# Patient Record
Sex: FEMALE | Race: WHITE | Employment: FULL TIME | URBAN - NONMETROPOLITAN AREA
[De-identification: names, ages, dates, MRNs, and addresses within clinical notes are randomized per-mention and may not be internally consistent; named-entity substitution may affect disease eponyms.]

---

## 2021-09-27 ENCOUNTER — OFFICE VISIT (OUTPATIENT)
Dept: OBGYN CLINIC | Age: 26
End: 2021-09-27

## 2021-09-27 VITALS — SYSTOLIC BLOOD PRESSURE: 115 MMHG | WEIGHT: 216 LBS | DIASTOLIC BLOOD PRESSURE: 72 MMHG

## 2021-09-27 DIAGNOSIS — N91.1 SECONDARY AMENORRHEA: Primary | ICD-10-CM

## 2021-09-27 DIAGNOSIS — L70.9 ACNE, UNSPECIFIED ACNE TYPE: ICD-10-CM

## 2021-09-27 PROCEDURE — 99203 OFFICE O/P NEW LOW 30 MIN: CPT | Performed by: NURSE PRACTITIONER

## 2021-09-27 ASSESSMENT — ENCOUNTER SYMPTOMS
RESPIRATORY NEGATIVE: 1
GASTROINTESTINAL NEGATIVE: 1

## 2021-09-27 NOTE — PROGRESS NOTES
PROBLEM VISIT     Date of service: 2021    Yaneth Mathis  Is a 23 y.o. female    PT's PCP is: No primary care provider on file. : 1995                                             Subjective:       Patient's last menstrual period was 2021. OB History    Para Term  AB Living   2 2 2     2   SAB TAB Ectopic Molar Multiple Live Births             2      # Outcome Date GA Lbr Channing/2nd Weight Sex Delivery Anes PTL Lv   2 Term 19 40w0d 04:00 / 00:19 8 lb 8.7 oz (3.875 kg) F Vag-Spont EPI N DAYA   1 Term 17 39w0d  8 lb 3 oz (3.714 kg) F Vag-Spont   DAYA        Social History     Tobacco Use   Smoking Status Never Smoker   Smokeless Tobacco Never Used        Social History     Substance and Sexual Activity   Alcohol Use Yes    Comment: occ       Allergies: Benadryl [diphenhydramine] and Pcn [penicillins]      Current Outpatient Medications:     BIOTIN PO, Take by mouth, Disp: , Rfl:     Social History     Substance and Sexual Activity   Sexual Activity Yes    Partners: Male     Chief Complaint   Patient presents with    Amenorrhea     No menses since May. Reports irregular cycles since last delivery in . Has been rx OCPS in past by previous OBGYN and has not helpful. States has taken several negative pregnancy tests. PE:  Vital Signs  Blood pressure 115/72, weight 216 lb (98 kg), last menstrual period 2021, not currently breastfeeding. HPI: Patient presents today with complaints of amenorrhea since May with several negative pregnancy tests. Reports using Depo for approximately 9 months following delivery in 2019. 2020-May 2021 had menses every other month, lasting 5-7 days. Denies any significant weight gain or loss,extreme stress or endocrine dysfunction. Reports ance along jaw line and cheeks. PT denies fever, chills, nausea and vomiting       Review of Systems   Constitutional: Negative. Respiratory: Negative. Cardiovascular: Negative. Gastrointestinal: Negative. Endocrine: Negative. Genitourinary: Positive for menstrual problem. Negative for dysuria, frequency, pelvic pain, vaginal bleeding and vaginal discharge. Skin:        acne   Neurological: Negative. Negative for syncope, light-headedness and headaches. Physical Exam  Constitutional:       Appearance: Normal appearance. She is obese. HENT:      Head: Normocephalic. Pulmonary:      Effort: Pulmonary effort is normal.   Musculoskeletal:         General: Normal range of motion. Neurological:      General: No focal deficit present. Mental Status: She is alert. Psychiatric:         Mood and Affect: Mood normal.         Behavior: Behavior normal.         Assessment and Plan          Diagnosis Orders   1. Secondary amenorrhea  hCG, Quantitative, Pregnancy    TSH With Reflex Ft4    Prolactin    Glucose Tolerance, 2 Hours    Insulin, Total    Insulin, Total   2. Acne, unspecified acne type  Testosterone, Free     Reviewed possible differentials- will proceed with labs at this time. With negative hcg will plan for Provera challenge. discussed importance of diet and exercise how how they can influence cycles. I have discontinued Ame Haro's montelukast, Cetirizine HCl, Fluticasone Propionate (FLONASE NA), and medroxyPROGESTERone. I am also having her maintain her BIOTIN PO. No follow-ups on file. There are no Patient Instructions on file for this visit. Over 50% of time spent on counseling and care coordination on: see assessment and plan,  She was also counseled on her preventative health maintenance recommendations and follow-up.         FF time: 30 min      Marlyse Scheuermann, APRN - NP,9/28/2021 1:08 PM

## 2021-09-28 ASSESSMENT — ENCOUNTER SYMPTOMS: ROS SKIN COMMENTS: ACNE

## 2021-10-11 ENCOUNTER — HOSPITAL ENCOUNTER (OUTPATIENT)
Age: 26
Setting detail: SPECIMEN
Discharge: HOME | End: 2021-10-11
Payer: MEDICARE

## 2021-10-11 DIAGNOSIS — L70.9 ACNE, UNSPECIFIED ACNE TYPE: ICD-10-CM

## 2021-10-11 DIAGNOSIS — N91.1 SECONDARY AMENORRHEA: ICD-10-CM

## 2021-10-11 LAB
AMOUNT GLUCOSE GIVEN: 75 G
GLUCOSE FASTING: 87 MG/DL (ref 65–99)
GLUCOSE TOLERANCE TEST 1 HOUR: 59 MG/DL (ref 65–184)
GLUCOSE TOLERANCE TEST 2 HOUR: 86 MG/DL (ref 65–139)
HCG QUANTITATIVE: <1 IU/L
INSULIN COMMENT: NORMAL
INSULIN COMMENT: NORMAL
INSULIN REFERENCE RANGE:: NORMAL
INSULIN REFERENCE RANGE:: NORMAL
INSULIN: 127.1 MU/L
INSULIN: 17.9 MU/L
PROLACTIN: 22.73 UG/L (ref 4.79–23.3)
SEX HORMONE BINDING GLOBULIN: 20 NMOL/L (ref 30–135)
TESTOSTERONE FREE-NONMALE: 10.8 PG/ML (ref 0.8–7.4)
TESTOSTERONE TOTAL: 46 NG/DL (ref 20–70)
TSH SERPL DL<=0.05 MIU/L-ACNC: 1.56 MIU/L (ref 0.3–5)

## 2021-10-13 ENCOUNTER — HOSPITAL ENCOUNTER (EMERGENCY)
Age: 26
Discharge: HOME | End: 2021-10-13
Attending: FAMILY MEDICINE
Payer: MEDICARE

## 2021-10-13 VITALS
WEIGHT: 209 LBS | TEMPERATURE: 97.4 F | SYSTOLIC BLOOD PRESSURE: 101 MMHG | DIASTOLIC BLOOD PRESSURE: 76 MMHG | RESPIRATION RATE: 16 BRPM | HEART RATE: 85 BPM | OXYGEN SATURATION: 94 % | HEIGHT: 62 IN | BODY MASS INDEX: 38.46 KG/M2

## 2021-10-13 DIAGNOSIS — J30.89 SEASONAL ALLERGIC RHINITIS DUE TO OTHER ALLERGIC TRIGGER: ICD-10-CM

## 2021-10-13 DIAGNOSIS — Z20.822 LAB TEST NEGATIVE FOR COVID-19 VIRUS: ICD-10-CM

## 2021-10-13 DIAGNOSIS — J06.9 VIRAL URI: Primary | ICD-10-CM

## 2021-10-13 LAB — SARS-COV-2, NAAT: NOT  DETECTED

## 2021-10-13 PROCEDURE — 99283 EMERGENCY DEPT VISIT LOW MDM: CPT

## 2021-10-13 RX ORDER — MEDROXYPROGESTERONE ACETATE 10 MG/1
10 TABLET ORAL DAILY
Qty: 10 TABLET | Refills: 0 | Status: SHIPPED | OUTPATIENT
Start: 2021-10-13 | End: 2021-10-13

## 2021-10-13 RX ORDER — FLUTICASONE PROPIONATE 50 MCG
2 SPRAY, SUSPENSION (ML) NASAL DAILY
Qty: 16 G | Refills: 2 | Status: SHIPPED | OUTPATIENT
Start: 2021-10-13

## 2021-10-13 ASSESSMENT — ENCOUNTER SYMPTOMS
EYE ITCHING: 1
CHEST TIGHTNESS: 0
DIARRHEA: 0
NAUSEA: 0
WHEEZING: 0
COLOR CHANGE: 0
ABDOMINAL PAIN: 0
VOMITING: 0
SHORTNESS OF BREATH: 0
SINUS PRESSURE: 0
SORE THROAT: 1
EYE REDNESS: 1
BACK PAIN: 0

## 2021-10-13 ASSESSMENT — PAIN DESCRIPTION - DESCRIPTORS: DESCRIPTORS: ACHING

## 2021-10-13 ASSESSMENT — PAIN DESCRIPTION - PAIN TYPE: TYPE: ACUTE PAIN

## 2021-10-13 ASSESSMENT — PAIN DESCRIPTION - LOCATION: LOCATION: HEAD

## 2021-10-13 ASSESSMENT — PAIN SCALES - GENERAL: PAINLEVEL_OUTOF10: 4

## 2021-10-13 NOTE — ED NOTES
Pt pink, warm and dry, breathing with ease. Prescription explained, pt states understanding. AVS reviewed including follow up appointments, diagnosis, and care of self at home. Denies questions or concerns. Pt remains alert and oriented. Pt discharged in stable condition.       Crystal Latif RN  10/13/21 3018

## 2021-10-13 NOTE — ED PROVIDER NOTES
[facility]          CHIEF COMPLAINT       Chief Complaint   Patient presents with    Other     Pt was exposed to coVid and needs tested to return to work. Nurses Notes reviewed and I agree except as noted in the HPI. HISTORY OF PRESENT ILLNESS    Kai Germain is a 23 y.o. female who presents for evaluation of possible COVID-19 infection. She states that her mother works in a nursing home and is awaiting her COVID-19 test result but they believe that she may have the infection so patient is concerned that she too may have COVID-23. Patient has noted headache, sore throat, congestion, loss of smell over the past couple of days. She states that today her eyes are red and itchy secondary to cat exposure at her mother's house. She states that her daughter has had a temperature today as well. REVIEW OF SYSTEMS     Review of Systems   Constitutional: Negative for appetite change, chills, fatigue and fever. HENT: Positive for congestion, ear pain and sore throat. Negative for sinus pressure. Loss of smell   Eyes: Positive for redness (after exposure to cat to which she is allergic) and itching. Respiratory: Negative for chest tightness, shortness of breath and wheezing. Cardiovascular: Negative for chest pain and leg swelling. Gastrointestinal: Negative for abdominal pain, diarrhea, nausea and vomiting. Genitourinary: Negative for dysuria, flank pain and frequency. Musculoskeletal: Negative for back pain, gait problem, joint swelling and neck stiffness. Skin: Negative for color change and rash. Neurological: Positive for headaches. Negative for dizziness and light-headedness. Psychiatric/Behavioral: Negative for agitation and hallucinations. The patient is not nervous/anxious. PAST MEDICAL HISTORY    has a past medical history of Allergic, Anxiety, Asthma, Depression, H/O headache, and Scoliosis.     SURGICAL HISTORY      has a past surgical history that includes Tonsillectomy. CURRENT MEDICATIONS       Previous Medications    BIOTIN PO    Take by mouth       ALLERGIES     is allergic to benadryl [diphenhydramine] and pcn [penicillins]. FAMILY HISTORY     She indicated that her mother is alive. She indicated that her father is alive. She indicated that the status of her sister is unknown. She indicated that her brother is alive. She indicated that the status of her paternal grandmother is unknown. She indicated that the status of her paternal grandfather is unknown.   family history includes Asthma in her father, paternal grandfather, and paternal grandmother; Deep Vein Thrombosis in her father; Depression in her brother, father, mother, and sister; Heart Disease in her father; High Cholesterol in her father, paternal grandfather, and paternal grandmother; Hypertension in her father, paternal grandfather, and paternal grandmother. SOCIAL HISTORY      reports that she has never smoked. She has never used smokeless tobacco. She reports current alcohol use. She reports that she does not use drugs. PHYSICAL EXAM     INITIAL VITALS:  height is 5' 2\" (1.575 m) and weight is 209 lb (94.8 kg). Her temporal temperature is 97.4 °F (36.3 °C). Her blood pressure is 101/76 and her pulse is 85. Her respiration is 16 and oxygen saturation is 94%. Physical Exam  Vitals and nursing note reviewed. Constitutional:       General: She is not in acute distress. Appearance: She is well-developed. HENT:      Head: Normocephalic and atraumatic. Right Ear: Tympanic membrane and ear canal normal.      Left Ear: Tympanic membrane and ear canal normal.   Eyes:      General:         Right eye: No discharge. Left eye: No discharge. Pupils: Pupils are equal, round, and reactive to light. Comments: Bilateral conjunctival injection right greater than left. Minimal edema noted to the upper right eyelid. Cardiovascular:      Rate and Rhythm: Normal rate and regular rhythm. Heart sounds: Normal heart sounds. Pulmonary:      Effort: Pulmonary effort is normal.      Breath sounds: Normal breath sounds. Abdominal:      General: Bowel sounds are normal. There is no distension. Palpations: Abdomen is soft. There is no mass. Tenderness: There is no abdominal tenderness. Skin:     General: Skin is warm and dry. Psychiatric:         Mood and Affect: Mood normal.         Behavior: Behavior normal.         DIFFERENTIAL DIAGNOSIS:   Viral URI, allergic rhinitis, COVID-19 infection    DIAGNOSTIC RESULTS         LABS:   Labs Reviewed   COVID-19, RAPID       DEPARTMENT COURSE:   Vitals:    Vitals:    10/13/21 1446   BP: 101/76   Pulse: 85   Resp: 16   Temp: 97.4 °F (36.3 °C)   TempSrc: Temporal   SpO2: 94%   Weight: 209 lb (94.8 kg)   Height: 5' 2\" (1.575 m)       MDM:  Patient presents with concern for possible COVID-19 infection after having URI symptoms combined with allergies. She test negative for COVID-19. She is recommend to use Flonase. She was previously using Claritin and was recommended by pharmacist to try Zyrtec. Based on patient's right conjunctivitis I recommended Flonase use. CRITICAL CARE:   None    CONSULTS:  None    PROCEDURES:  None    FINAL IMPRESSION      1. Viral URI    2. Seasonal allergic rhinitis due to other allergic trigger          DISPOSITION/PLAN   Discharge    PATIENT REFERRED TO:  Your doctor or call [phone] to find a doctor near you.     Schedule an appointment as soon as possible for a visit   As needed      DISCHARGEMEDICATIONS:  New Prescriptions    FLUTICASONE (FLONASE) 50 MCG/ACT NASAL SPRAY    2 sprays by Each Nostril route daily       (Please note that portions of this note were completedwith a voice recognition program.  Efforts were made to edit the dictations but occasionally words are mis-transcribed.)    MD Arnaud Vazquez, MD  10/13/21 1540

## 2021-11-02 ENCOUNTER — OFFICE VISIT (OUTPATIENT)
Dept: OBGYN CLINIC | Age: 26
End: 2021-11-02
Payer: MEDICARE

## 2021-11-02 VITALS — SYSTOLIC BLOOD PRESSURE: 115 MMHG | WEIGHT: 210.7 LBS | DIASTOLIC BLOOD PRESSURE: 71 MMHG | BODY MASS INDEX: 38.54 KG/M2

## 2021-11-02 DIAGNOSIS — E88.81 METABOLIC SYNDROME: Primary | ICD-10-CM

## 2021-11-02 PROCEDURE — G8417 CALC BMI ABV UP PARAM F/U: HCPCS | Performed by: NURSE PRACTITIONER

## 2021-11-02 PROCEDURE — 1036F TOBACCO NON-USER: CPT | Performed by: NURSE PRACTITIONER

## 2021-11-02 PROCEDURE — G8427 DOCREV CUR MEDS BY ELIG CLIN: HCPCS | Performed by: NURSE PRACTITIONER

## 2021-11-02 PROCEDURE — G8484 FLU IMMUNIZE NO ADMIN: HCPCS | Performed by: NURSE PRACTITIONER

## 2021-11-02 PROCEDURE — 99213 OFFICE O/P EST LOW 20 MIN: CPT | Performed by: NURSE PRACTITIONER

## 2021-11-02 NOTE — PROGRESS NOTES
PROBLEM VISIT     Date of service: 2021    Jannie Garrison  Is a 23 y.o. female    PT's PCP is: No primary care provider on file. : 1995                                             Subjective:       Patient's last menstrual period was 10/21/2021. OB History    Para Term  AB Living   2 2 2     2   SAB TAB Ectopic Molar Multiple Live Births             2      # Outcome Date GA Lbr Channing/2nd Weight Sex Delivery Anes PTL Lv   2 Term 19 40w0d 04:00 / 00:19 8 lb 8.7 oz (3.875 kg) F Vag-Spont EPI N DAYA   1 Term 17 39w0d  8 lb 3 oz (3.714 kg) F Vag-Spont   DAYA        Social History     Tobacco Use   Smoking Status Never Smoker   Smokeless Tobacco Never Used        Social History     Substance and Sexual Activity   Alcohol Use Yes    Comment: occ       Allergies: Benadryl [diphenhydramine] and Pcn [penicillins]      Current Outpatient Medications:     fluticasone (FLONASE) 50 MCG/ACT nasal spray, 2 sprays by Each Nostril route daily, Disp: 16 g, Rfl: 2    BIOTIN PO, Take by mouth, Disp: , Rfl:     Social History     Substance and Sexual Activity   Sexual Activity Yes    Partners: Male     Chief Complaint   Patient presents with    Follow-up     Follow up after Provera. Reports menses started 10/21-10/29. Discuss recent labs. PE:  Vital Signs  Blood pressure 115/71, weight 210 lb 11.2 oz (95.6 kg), last menstrual period 10/21/2021, not currently breastfeeding. HPI: Patient presents today to discuss recent labs. States she had Provera withdraw bleed from 10/21-10/29. PT denies fever, chills, nausea and vomiting       Review of Systems   Constitutional: Negative. Respiratory: Negative. Cardiovascular: Negative. Gastrointestinal: Negative. Genitourinary: Positive for menstrual problem. Negative for dysuria, frequency, pelvic pain, vaginal bleeding and vaginal discharge.    Skin:        Ance- jaw line, cheeks       Physical Exam  Constitutional: Appearance: Normal appearance. She is obese. HENT:      Head: Normocephalic. Pulmonary:      Effort: Pulmonary effort is normal.   Musculoskeletal:         General: Normal range of motion. Neurological:      General: No focal deficit present. Mental Status: She is alert. Psychiatric:         Mood and Affect: Mood normal.         Behavior: Behavior normal.         Assessment and Plan          Diagnosis Orders   1. Metabolic syndrome  metFORMIN (GLUCOPHAGE) 500 MG tablet     Labs reviewed     Fasting insulin 17.9 (ideally would like less than 10) and two hr level 127.1 (ideally would like less than 30). IR reviewed in detail encouraged 5-10% weight loss. Metformin discussed. reviewed PCOS and rotterdam criteria. Questions answered. Patient declines cycle control at this time. She is aware to call if no menses in 3 months for Provera challenge. I am having Ame Haro start on metFORMIN. I am also having her maintain her BIOTIN PO and fluticasone. No follow-ups on file. There are no Patient Instructions on file for this visit. Over 50% of time spent on counseling and care coordination on: see assessment and plan,  She was also counseled on her preventative health maintenance recommendations and follow-up.         FF time: 20 min      CARIE Virgen NP,11/5/2021 1:31 PM

## 2021-11-05 ASSESSMENT — ENCOUNTER SYMPTOMS
GASTROINTESTINAL NEGATIVE: 1
RESPIRATORY NEGATIVE: 1

## 2022-01-03 ENCOUNTER — OFFICE VISIT (OUTPATIENT)
Dept: OBGYN CLINIC | Age: 27
End: 2022-01-03
Payer: MEDICARE

## 2022-01-03 VITALS — DIASTOLIC BLOOD PRESSURE: 70 MMHG | WEIGHT: 211.6 LBS | BODY MASS INDEX: 38.7 KG/M2 | SYSTOLIC BLOOD PRESSURE: 102 MMHG

## 2022-01-03 DIAGNOSIS — E88.81 METABOLIC SYNDROME: Primary | ICD-10-CM

## 2022-01-03 DIAGNOSIS — E28.2 POLYCYSTIC OVARIAN SYNDROME: ICD-10-CM

## 2022-01-03 DIAGNOSIS — N92.6 IRREGULAR MENSES: ICD-10-CM

## 2022-01-03 PROCEDURE — 1036F TOBACCO NON-USER: CPT | Performed by: NURSE PRACTITIONER

## 2022-01-03 PROCEDURE — G8417 CALC BMI ABV UP PARAM F/U: HCPCS | Performed by: NURSE PRACTITIONER

## 2022-01-03 PROCEDURE — G8427 DOCREV CUR MEDS BY ELIG CLIN: HCPCS | Performed by: NURSE PRACTITIONER

## 2022-01-03 PROCEDURE — G8484 FLU IMMUNIZE NO ADMIN: HCPCS | Performed by: NURSE PRACTITIONER

## 2022-01-03 PROCEDURE — 99213 OFFICE O/P EST LOW 20 MIN: CPT | Performed by: NURSE PRACTITIONER

## 2022-01-03 ASSESSMENT — ENCOUNTER SYMPTOMS
RESPIRATORY NEGATIVE: 1
GASTROINTESTINAL NEGATIVE: 1

## 2022-01-03 NOTE — PROGRESS NOTES
PROBLEM VISIT     Date of service: 1/3/2022    Rosio Mcgarry  Is a 27 y.o. female    PT's PCP is: No primary care provider on file. : 1995                                             Subjective:       Patient's last menstrual period was 2021. OB History    Para Term  AB Living   2 2 2     2   SAB IAB Ectopic Molar Multiple Live Births             2      # Outcome Date GA Lbr Channing/2nd Weight Sex Delivery Anes PTL Lv   2 Term 19 40w0d 04:00 / 00:19 8 lb 8.7 oz (3.875 kg) F Vag-Spont EPI N DAYA   1 Term 17 39w0d  8 lb 3 oz (3.714 kg) F Vag-Spont   DAYA        Social History     Tobacco Use   Smoking Status Never Smoker   Smokeless Tobacco Never Used        Social History     Substance and Sexual Activity   Alcohol Use Yes    Comment: occ       Allergies: Benadryl [diphenhydramine] and Pcn [penicillins]      Current Outpatient Medications:     metFORMIN (GLUCOPHAGE) 500 MG tablet, Take 1 tablet by mouth 2 times daily (with meals), Disp: 60 tablet, Rfl: 2    fluticasone (FLONASE) 50 MCG/ACT nasal spray, 2 sprays by Each Nostril route daily, Disp: 16 g, Rfl: 2    BIOTIN PO, Take by mouth, Disp: , Rfl:     Social History     Substance and Sexual Activity   Sexual Activity Yes    Partners: Male     Chief Complaint   Patient presents with    Follow-up     Follow up on Metformin. States only took 30 days worth of metformin because that is all she recieved from pharmacy         PE:  Vital Signs  Blood pressure 102/70, weight 211 lb 9.6 oz (96 kg), last menstrual period 2021, not currently breastfeeding. HPI: Patient presents today for medication follow up. Reports she took 30 days of Metformin because that is all the pharmacy gave her. Denies any undesirable side effects at this time. PT denies fever, chills, nausea and vomiting       Review of Systems   Constitutional: Negative. Respiratory: Negative. Cardiovascular: Negative.     Gastrointestinal: Negative. Genitourinary: Positive for menstrual problem (irregular ). Negative for dysuria, frequency, pelvic pain, vaginal bleeding and vaginal discharge. Physical Exam  Constitutional:       Appearance: Normal appearance. She is obese. HENT:      Head: Normocephalic. Pulmonary:      Effort: Pulmonary effort is normal.   Musculoskeletal:         General: Normal range of motion. Neurological:      General: No focal deficit present. Mental Status: She is alert. Psychiatric:         Mood and Affect: Mood normal.         Behavior: Behavior normal.         Assessment and Plan          Diagnosis Orders   1. Metabolic syndrome  metFORMIN (GLUCOPHAGE) 500 MG tablet   2. Polycystic ovarian syndrome     3. Irregular menses  hCG, Quantitative, Pregnancy       reviewed metformin and encouraged diet/exercise regimen. Patient eliminating pop/sugary drinks. I have changed Ame Haro's metFORMIN. I am also having her maintain her BIOTIN PO and fluticasone. Return in about 3 months (around 4/3/2022) for yearly, med check. There are no Patient Instructions on file for this visit.       CARIE Maldonado NP,1/3/2022 9:27 PM

## 2022-03-11 ENCOUNTER — HOSPITAL ENCOUNTER (EMERGENCY)
Age: 27
Discharge: HOME | End: 2022-03-11
Attending: FAMILY MEDICINE
Payer: COMMERCIAL

## 2022-03-11 VITALS
TEMPERATURE: 96.9 F | HEART RATE: 72 BPM | BODY MASS INDEX: 39.56 KG/M2 | HEIGHT: 62 IN | RESPIRATION RATE: 19 BRPM | WEIGHT: 215 LBS | DIASTOLIC BLOOD PRESSURE: 78 MMHG | SYSTOLIC BLOOD PRESSURE: 140 MMHG | OXYGEN SATURATION: 98 %

## 2022-03-11 DIAGNOSIS — L02.91 ABSCESS: Primary | ICD-10-CM

## 2022-03-11 PROCEDURE — 99285 EMERGENCY DEPT VISIT HI MDM: CPT

## 2022-03-11 RX ORDER — SULFAMETHOXAZOLE AND TRIMETHOPRIM 800; 160 MG/1; MG/1
1 TABLET ORAL 2 TIMES DAILY
Qty: 14 TABLET | Refills: 0 | Status: SHIPPED | OUTPATIENT
Start: 2022-03-11 | End: 2022-03-18

## 2022-03-11 RX ORDER — ALBUTEROL SULFATE 90 UG/1
2 AEROSOL, METERED RESPIRATORY (INHALATION) EVERY 6 HOURS PRN
COMMUNITY

## 2022-03-11 ASSESSMENT — ENCOUNTER SYMPTOMS
COLOR CHANGE: 0
NAUSEA: 0
VOMITING: 0

## 2022-03-11 ASSESSMENT — PAIN SCALES - GENERAL: PAINLEVEL_OUTOF10: 6

## 2022-03-11 ASSESSMENT — PAIN DESCRIPTION - PAIN TYPE: TYPE: ACUTE PAIN

## 2022-03-11 NOTE — Clinical Note
Tatum Florian was seen and treated in our emergency department on 3/11/2022. She may return to work on 03/12/2022. If you have any questions or concerns, please don't hesitate to call.       Charlee Samayoa MD

## 2022-03-12 NOTE — ED NOTES
Pt stable A&O x 3 given discharge and follow up info. Pt voiced no concerns and discharged from ER to self to home. Pt ambulated out of ER with no complications .        Jose G Mejias RN  03/11/22 2026

## 2022-03-12 NOTE — ED TRIAGE NOTES
Pt comes walking into ER room 4 from triage with an abscess on her buttocks that she only just noticed today. . . . While she was getting ready for work. She says that she use to get abscess boils all the time on her lower abdomen and in her arm pits, and today she has one on here buttocks in her gluteal  cleft fold.

## 2022-03-12 NOTE — ED PROVIDER NOTES
[facility]  eMERGENCY dEPARTMENT eNCOUnter          CHIEF COMPLAINT       Chief Complaint   Patient presents with    Abscess     BUTTOCKS        Nurses Notes reviewed and I agree except as noted in the HPI. HISTORY OF PRESENT ILLNESS    Liz Eagle is a 27 y.o. female who presents with possible abscess to crease of right buttocks. Patient notes onset today. Mild pain. Denies fever,chills. REVIEW OF SYSTEMS     Review of Systems   Constitutional: Negative for chills and fever. Gastrointestinal: Negative for nausea and vomiting. Skin: Positive for wound (right buttocks crease). Negative for color change. Psychiatric/Behavioral: Negative for agitation and behavioral problems. All other systems reviewed and are negative. PAST MEDICAL HISTORY    has a past medical history of Allergic, Anxiety, Asthma, Depression, Diabetes mellitus ([address] H/O headache, and Scoliosis. SURGICAL HISTORY      has a past surgical history that includes Tonsillectomy. CURRENT MEDICATIONS       Previous Medications    ALBUTEROL SULFATE HFA (VENTOLIN HFA) 108 (90 BASE) MCG/ACT INHALER    Inhale 2 puffs into the lungs every 6 hours as needed for Wheezing    BIOTIN PO    Take by mouth    FLUTICASONE (FLONASE) 50 MCG/ACT NASAL SPRAY    2 sprays by Each Nostril route daily    METFORMIN (GLUCOPHAGE) 500 MG TABLET    Take 1 tablet by mouth 2 times daily (with meals)       ALLERGIES     is allergic to benadryl [diphenhydramine] and pcn [penicillins]. FAMILY HISTORY     She indicated that her mother is alive. She indicated that her father is alive. She indicated that the status of her sister is unknown. She indicated that her brother is alive. She indicated that the status of her paternal grandmother is unknown.  She indicated that the status of her paternal grandfather is unknown.   family history includes Asthma in her father, paternal grandfather, and paternal grandmother; Deep Vein Thrombosis in her father; Depression in her brother, father, mother, and sister; Heart Disease in her father; High Cholesterol in her father, paternal grandfather, and paternal grandmother; Hypertension in her father, paternal grandfather, and paternal grandmother. SOCIAL HISTORY      reports that she has never smoked. She has never used smokeless tobacco. She reports current alcohol use. She reports that she does not use drugs. PHYSICAL EXAM     INITIAL VITALS:  height is 5' 2\" (1.575 m) and weight is 215 lb (97.5 kg). Her temporal temperature is 96.9 °F (36.1 °C). Her blood pressure is 140/78 (abnormal) and her pulse is 72. Her respiration is 19 and oxygen saturation is 98%. Physical Exam  Vitals and nursing note reviewed. Constitutional:       General: She is not in acute distress. Appearance: She is obese. Skin:     Findings: No erythema or rash. Comments: Small non fluctuant abscess right gluteal crease. Neurological:      Mental Status: She is alert. Psychiatric:         Mood and Affect: Mood normal.         Behavior: Behavior normal.         DIFFERENTIAL DIAGNOSIS:   Abscess gluteus crease     DIAGNOSTIC RESULTS         LABS:   Labs Reviewed - No data to display    EMERGENCY DEPARTMENT COURSE:   Vitals:    Vitals:    03/11/22 1944   BP: (!) 140/78   Pulse: 72   Resp: 19   Temp: 96.9 °F (36.1 °C)   TempSrc: Temporal   SpO2: 98%   Weight: 215 lb (97.5 kg)   Height: 5' 2\" (1.575 m)     Small non fluctuant abscess right gluteal crease. Will have patient apply warm compresses. Bactrim DS prescribed. Follow up if abscess enlarges. Care instructions provided. PROCEDURES:  None    FINAL IMPRESSION      1. Abscess          DISPOSITION/PLAN   Home. Care instructions provided. Follow up as needed. PATIENT REFERRED TO:  No follow-up provider specified.     DISCHARGE MEDICATIONS:  New Prescriptions    SULFAMETHOXAZOLE-TRIMETHOPRIM (BACTRIM DS) 800-160 MG PER TABLET    Take 1 tablet by mouth 2 times daily for 7 days       (Please note that portions of this note were completed with a voice recognition program.  Efforts were made to edit the dictations but occasionally words are mis-transcribed.)    MD Brian Acevedo MD  03/11/22 2013

## 2022-04-07 ENCOUNTER — OFFICE VISIT (OUTPATIENT)
Dept: OBGYN CLINIC | Age: 27
End: 2022-04-07
Payer: COMMERCIAL

## 2022-04-07 ENCOUNTER — HOSPITAL ENCOUNTER (OUTPATIENT)
Age: 27
Setting detail: SPECIMEN
Discharge: HOME | End: 2022-04-07

## 2022-04-07 VITALS
BODY MASS INDEX: 38.57 KG/M2 | WEIGHT: 209.6 LBS | SYSTOLIC BLOOD PRESSURE: 115 MMHG | DIASTOLIC BLOOD PRESSURE: 76 MMHG | HEIGHT: 62 IN

## 2022-04-07 DIAGNOSIS — N91.1 SECONDARY AMENORRHEA: ICD-10-CM

## 2022-04-07 DIAGNOSIS — Z01.419 WOMEN'S ANNUAL ROUTINE GYNECOLOGICAL EXAMINATION: Primary | ICD-10-CM

## 2022-04-07 DIAGNOSIS — E88.81 METABOLIC SYNDROME: ICD-10-CM

## 2022-04-07 LAB
CONTROL: NORMAL
PREGNANCY TEST URINE, POC: NEGATIVE

## 2022-04-07 PROCEDURE — 81025 URINE PREGNANCY TEST: CPT | Performed by: NURSE PRACTITIONER

## 2022-04-07 PROCEDURE — 99395 PREV VISIT EST AGE 18-39: CPT | Performed by: NURSE PRACTITIONER

## 2022-04-07 RX ORDER — CETIRIZINE HYDROCHLORIDE 10 MG/1
10 TABLET ORAL DAILY
COMMUNITY

## 2022-04-07 RX ORDER — MEDROXYPROGESTERONE ACETATE 10 MG/1
10 TABLET ORAL DAILY
Qty: 10 TABLET | Refills: 0 | Status: SHIPPED | OUTPATIENT
Start: 2022-04-07 | End: 2022-06-07 | Stop reason: ALTCHOICE

## 2022-04-07 ASSESSMENT — ENCOUNTER SYMPTOMS
DIARRHEA: 0
SHORTNESS OF BREATH: 0
CONSTIPATION: 0
ABDOMINAL PAIN: 0

## 2022-04-07 NOTE — PROGRESS NOTES
YEARLY PHYSICAL    Date of service: 2022    Irvin May  Is a 27 y.o.  female    PT's PCP is: No primary care provider on file. : 1995                                         Chaperone for Intimate Exam   Chaperone was offered as part of the rooming process. Patient declined and agrees to continue with exam without a chaperone.  Chaperone: n/a      Subjective:       No LMP recorded. (Menstrual status: Irregular periods).      Are your menses regular: no    OB History    Para Term  AB Living   2 2 2     2   SAB IAB Ectopic Molar Multiple Live Births             2      # Outcome Date GA Lbr Channing/2nd Weight Sex Delivery Anes PTL Lv   2 Term 19 40w0d 04:00 / 00:19 8 lb 8.7 oz (3.875 kg) F Vag-Spont EPI N DAYA   1 Term 17 39w0d  8 lb 3 oz (3.714 kg) F Vag-Spont   DAYA        Social History     Tobacco Use   Smoking Status Never Smoker   Smokeless Tobacco Never Used        Social History     Substance and Sexual Activity   Alcohol Use Yes    Comment: occ       Family History   Problem Relation Age of Onset    Depression Mother     Asthma Father     Depression Father     Heart Disease Father     Hypertension Father     Deep Vein Thrombosis Father     High Cholesterol Father     Depression Brother     Asthma Paternal Grandfather     Hypertension Paternal Grandfather     High Cholesterol Paternal Grandfather     Asthma Paternal Grandmother     Hypertension Paternal Grandmother     High Cholesterol Paternal Grandmother     Depression Sister        Any family history of breast or ovarian cancer: No    Any family history of blood clots: Yes      Allergies: Benadryl [diphenhydramine] and Pcn [penicillins]      Current Outpatient Medications:     cetirizine (ZYRTEC) 10 MG tablet, Take 10 mg by mouth daily, Disp: , Rfl:     medroxyPROGESTERone (PROVERA) 10 MG tablet, Take 1 tablet by mouth daily, Disp: 10 tablet, Rfl: 0    metFORMIN (GLUCOPHAGE) 500 MG tablet, Take 1 tablet by mouth 2 times daily (with meals), Disp: 60 tablet, Rfl: 1    albuterol sulfate HFA (VENTOLIN HFA) 108 (90 Base) MCG/ACT inhaler, Inhale 2 puffs into the lungs every 6 hours as needed for Wheezing, Disp: , Rfl:     fluticasone (FLONASE) 50 MCG/ACT nasal spray, 2 sprays by Each Nostril route daily, Disp: 16 g, Rfl: 2    BIOTIN PO, Take by mouth, Disp: , Rfl:     Social History     Substance and Sexual Activity   Sexual Activity Yes    Partners: Male       Any bleeding or pain with intercourse: No    Last Yearly:  ? Last pap: ? Last HPV: ?    Do you do self breast exams: encouraged     Past Medical History:   Diagnosis Date    Allergic     Anxiety     Asthma     Depression     Diabetes mellitus ([facility] [address]     H/O headache     Scoliosis        Past Surgical History:   Procedure Laterality Date    TONSILLECTOMY         Family History   Problem Relation Age of Onset    Depression Mother     Asthma Father     Depression Father     Heart Disease Father     Hypertension Father     Deep Vein Thrombosis Father     High Cholesterol Father     Depression Brother     Asthma Paternal Grandfather     Hypertension Paternal Grandfather     High Cholesterol Paternal Grandfather     Asthma Paternal Grandmother     Hypertension Paternal Grandmother     High Cholesterol Paternal Grandmother     Depression Sister        Chief Complaint   Patient presents with    Gynecologic Exam     Last pap 1/21/19. No menses since November. Voices no concerns. PE:  Vital Signs  Blood pressure 115/76, height 5' 2\" (1.575 m), weight 209 lb 9.6 oz (95.1 kg), not currently breastfeeding. Estimated body mass index is 38.34 kg/m² as calculated from the following:    Height as of this encounter: 5' 2\" (1.575 m). Weight as of this encounter: 209 lb 9.6 oz (95.1 kg).     Labs:    Results for orders placed or performed in visit on 04/07/22   POCT urine pregnancy   Result Value Ref Range    Preg Test, Ur Negative     Control         HPI: Patient presents today for annual exam. Denies breast/pelvic pain. Due for pap. Declines STD screening. Reports no menses since November. Has been taking Metformin for the past 3 months and now attempting diet and exercise changes. Review of Systems   Constitutional: Negative for chills, fatigue and fever. Respiratory: Negative for shortness of breath. Cardiovascular: Negative for chest pain. Gastrointestinal: Negative for abdominal pain, constipation and diarrhea. Genitourinary: Positive for menstrual problem. Negative for dysuria, enuresis, frequency, pelvic pain, urgency and vaginal bleeding. Neurological: Negative for dizziness, light-headedness and headaches. Physical Exam  Constitutional:       General: She is not in acute distress. Appearance: Normal appearance. She is not ill-appearing. Genitourinary:      Vulva normal.      No vaginal discharge. Right Adnexa: not tender. Left Adnexa: not tender. Uterus is not tender. Breasts:      Right: No mass, nipple discharge, skin change or tenderness. Left: No mass, nipple discharge, skin change or tenderness. HENT:      Head: Normocephalic. Cardiovascular:      Rate and Rhythm: Normal rate and regular rhythm. Pulmonary:      Effort: Pulmonary effort is normal.      Breath sounds: Normal breath sounds. Abdominal:      Palpations: Abdomen is soft. Tenderness: There is no abdominal tenderness. There is no guarding or rebound. Musculoskeletal:         General: Normal range of motion. Neurological:      General: No focal deficit present. Mental Status: She is alert. Psychiatric:         Mood and Affect: Mood normal.         Behavior: Behavior normal.                          Assessment and Plan          Diagnosis Orders   1.  Women's annual routine gynecological examination  PAP SMEAR 2. Metabolic syndrome  metFORMIN (GLUCOPHAGE) 500 MG tablet   3. Secondary amenorrhea  POCT urine pregnancy    medroxyPROGESTERone (PROVERA) 10 MG tablet       Repeat Annual every 1 year  Cervical Cytology Evaluation begins at 24years old. If Negative Cytology, Follow-up screening per current guidelines. Mammograms every 1year. If 35 yo and last mammogram was negative. Routine healthmaintenance per patients PCP. patient agreeable to provera challenge. Discussed calling if no menses in 3 months. Encouraged continued diet and exercise   Needs repeat fasting insulin at med check       I am having Ame Haro start on medroxyPROGESTERone. I am also having her maintain her BIOTIN PO, fluticasone, albuterol sulfate HFA, cetirizine, and metFORMIN. Return in about 3 months (around 7/7/2022) for med check metformin . She was also counseled on her preventative health maintenance recommendations and follow-up. There are no Patient Instructions on file for this visit.     CARIE Brandon NP,4/7/2022 3:35 PM

## 2022-04-18 LAB — CYTOLOGY REPORT: NORMAL

## 2022-06-07 ENCOUNTER — OFFICE VISIT (OUTPATIENT)
Dept: OBGYN CLINIC | Age: 27
End: 2022-06-07
Payer: MEDICARE

## 2022-06-07 ENCOUNTER — HOSPITAL ENCOUNTER (OUTPATIENT)
Age: 27
Setting detail: SPECIMEN
Discharge: HOME | End: 2022-06-07

## 2022-06-07 VITALS — SYSTOLIC BLOOD PRESSURE: 112 MMHG | WEIGHT: 209 LBS | DIASTOLIC BLOOD PRESSURE: 65 MMHG | BODY MASS INDEX: 38.23 KG/M2

## 2022-06-07 DIAGNOSIS — Z11.3 SCREENING FOR STDS (SEXUALLY TRANSMITTED DISEASES): ICD-10-CM

## 2022-06-07 DIAGNOSIS — Z11.3 SCREENING FOR STDS (SEXUALLY TRANSMITTED DISEASES): Primary | ICD-10-CM

## 2022-06-07 LAB
CANDIDA SPECIES, DNA PROBE: NEGATIVE
GARDNERELLA VAGINALIS, DNA PROBE: NEGATIVE
HEPATITIS B SURFACE ANTIGEN: NONREACTIVE
HEPATITIS C ANTIBODY: NONREACTIVE
HIV AG/AB: NONREACTIVE
SOURCE: NORMAL
T. PALLIDUM, IGG: NONREACTIVE
TRICHOMONAS VAGINALIS DNA: NEGATIVE

## 2022-06-07 PROCEDURE — 99213 OFFICE O/P EST LOW 20 MIN: CPT | Performed by: NURSE PRACTITIONER

## 2022-06-07 PROCEDURE — G8427 DOCREV CUR MEDS BY ELIG CLIN: HCPCS | Performed by: NURSE PRACTITIONER

## 2022-06-07 PROCEDURE — G8417 CALC BMI ABV UP PARAM F/U: HCPCS | Performed by: NURSE PRACTITIONER

## 2022-06-07 PROCEDURE — 1036F TOBACCO NON-USER: CPT | Performed by: NURSE PRACTITIONER

## 2022-06-07 RX ORDER — IBUPROFEN 800 MG/1
TABLET ORAL
COMMUNITY
Start: 2022-05-12

## 2022-06-07 NOTE — PROGRESS NOTES
PROBLEM VISIT     Date of service: 2022    Jeff Matthews  Is a 27 y.o. female    PT's PCP is: No primary care provider on file. : 1995                                             Subjective:       Patient's last menstrual period was 2022. OB History    Para Term  AB Living   2 2 2     2   SAB IAB Ectopic Molar Multiple Live Births             2      # Outcome Date GA Lbr Channing/2nd Weight Sex Delivery Anes PTL Lv   2 Term 19 40w0d 04:00 / :19 8 lb 8.7 oz (3.875 kg) F Vag-Spont EPI N DAYA   1 Term 17 39w0d  8 lb 3 oz (3.714 kg) F Vag-Spont   DAYA        Social History     Tobacco Use   Smoking Status Never Smoker   Smokeless Tobacco Never Used        Social History     Substance and Sexual Activity   Alcohol Use Yes    Comment: occ       Allergies: Benadryl [diphenhydramine] and Pcn [penicillins]      Current Outpatient Medications:     ibuprofen (ADVIL;MOTRIN) 800 MG tablet, , Disp: , Rfl:     cetirizine (ZYRTEC) 10 MG tablet, Take 10 mg by mouth daily, Disp: , Rfl:     metFORMIN (GLUCOPHAGE) 500 MG tablet, Take 1 tablet by mouth 2 times daily (with meals) (Patient not taking: Reported on 2022), Disp: 60 tablet, Rfl: 1    albuterol sulfate HFA (VENTOLIN HFA) 108 (90 Base) MCG/ACT inhaler, Inhale 2 puffs into the lungs every 6 hours as needed for Wheezing, Disp: , Rfl:     fluticasone (FLONASE) 50 MCG/ACT nasal spray, 2 sprays by Each Nostril route daily, Disp: 16 g, Rfl: 2    BIOTIN PO, Take by mouth, Disp: , Rfl:     Social History     Substance and Sexual Activity   Sexual Activity Yes    Partners: Male     Chief Complaint   Patient presents with    Sexually Transmitted Diseases     Desires full STD testing. Denies symptoms. PE:  Vital Signs  Blood pressure 112/65, weight 209 lb (94.8 kg), last menstrual period 2022, not currently breastfeeding. HPI: Patient presents today for STD screening. Denies any symptoms currently. Denies any known exposures. PT denies fever, chills, nausea and vomiting       Review of Systems   Constitutional: Negative. Genitourinary: Negative. Physical Exam  Constitutional:       Appearance: Normal appearance. HENT:      Head: Normocephalic. Pulmonary:      Effort: Pulmonary effort is normal.   Genitourinary:     General: Normal vulva. Vagina: No vaginal discharge, erythema or bleeding. Cervix: Normal.   Musculoskeletal:         General: Normal range of motion. Neurological:      General: No focal deficit present. Mental Status: She is alert. Psychiatric:         Mood and Affect: Mood normal.         Behavior: Behavior normal.     chaperone: not present     Assessment and Plan          Diagnosis Orders   1. Screening for STDs (sexually transmitted diseases)  Chlamydia Trachomatis & Neisseria gonorrhoeae (GC) by amplified detection    Vaginitis DNA Probe    Hepatitis B Surface Antigen    Hepatitis C Antibody    HIV Screen    T. Pallidum Ab             I have discontinued Ame Haro's medroxyPROGESTERone. I am also having her maintain her BIOTIN PO, fluticasone, albuterol sulfate HFA, cetirizine, metFORMIN, and ibuprofen. Return if symptoms worsen or fail to improve. There are no Patient Instructions on file for this visit.     CARIE Echevarria NP,6/7/2022 8:56 AM

## 2022-06-08 LAB
C TRACH DNA GENITAL QL NAA+PROBE: NEGATIVE
N. GONORRHOEAE DNA: NEGATIVE
SPECIMEN DESCRIPTION: NORMAL

## 2022-07-28 ENCOUNTER — TELEPHONE (OUTPATIENT)
Dept: OBGYN CLINIC | Age: 27
End: 2022-07-28

## 2022-12-15 ENCOUNTER — TELEPHONE (OUTPATIENT)
Dept: OBGYN CLINIC | Age: 27
End: 2022-12-15

## 2022-12-15 NOTE — TELEPHONE ENCOUNTER
Patient called. She reports that she has been having regular periods without her metformin. She also reports breast pain.   Appointment scheduled for pt to come in for veronica.

## 2022-12-19 ENCOUNTER — OFFICE VISIT (OUTPATIENT)
Dept: OBGYN CLINIC | Age: 27
End: 2022-12-19
Payer: MEDICARE

## 2022-12-19 VITALS
SYSTOLIC BLOOD PRESSURE: 110 MMHG | WEIGHT: 198.6 LBS | HEIGHT: 62 IN | DIASTOLIC BLOOD PRESSURE: 60 MMHG | BODY MASS INDEX: 36.55 KG/M2

## 2022-12-19 DIAGNOSIS — R07.89 MUSCULOSKELETAL CHEST PAIN: ICD-10-CM

## 2022-12-19 DIAGNOSIS — N64.4 BREAST PAIN: Primary | ICD-10-CM

## 2022-12-19 PROCEDURE — 1036F TOBACCO NON-USER: CPT | Performed by: ADVANCED PRACTICE MIDWIFE

## 2022-12-19 PROCEDURE — G8484 FLU IMMUNIZE NO ADMIN: HCPCS | Performed by: ADVANCED PRACTICE MIDWIFE

## 2022-12-19 PROCEDURE — G8417 CALC BMI ABV UP PARAM F/U: HCPCS | Performed by: ADVANCED PRACTICE MIDWIFE

## 2022-12-19 PROCEDURE — G8427 DOCREV CUR MEDS BY ELIG CLIN: HCPCS | Performed by: ADVANCED PRACTICE MIDWIFE

## 2022-12-19 PROCEDURE — 99213 OFFICE O/P EST LOW 20 MIN: CPT | Performed by: ADVANCED PRACTICE MIDWIFE

## 2022-12-19 ASSESSMENT — ENCOUNTER SYMPTOMS
CHEST TIGHTNESS: 1
SHORTNESS OF BREATH: 1
GASTROINTESTINAL NEGATIVE: 1
COUGH: 0

## 2022-12-19 NOTE — PROGRESS NOTES
PROBLEM VISIT     Date of service: 2022    Hasmukh Medrano  Is a 24 y.o.  female    PT's PCP is: No primary care provider on file. : 1995                                          HPI  Here for evaluation of chest/breast pain. 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 Pain does come and go - yesterday \"did not really feel any pain at all\". Today states \"just want to hold my breasts to make it feel better\". Has recently started use of caffeine again - had not used it for quite some time - drinking 16-32oz daily of [facility]. No new bras. Has taken tylenol/motrin without relief. Has had \"just a few\" episodes of dyspnea and with this has bilateral pain in ribs - no cough present. Most recent SOB episode - 5 days. Review of Systems   Constitutional: Negative. HENT: Negative. Respiratory:  Positive for chest tightness and shortness of breath. Negative for cough. Cardiovascular:  Positive for chest pain. Gastrointestinal: Negative. Genitourinary:         Bilateral breast pain   Neurological: Negative. Psychiatric/Behavioral: Negative. Patient's last menstrual period was 2022 (exact date).    OB History    Para Term  AB Living   2 2 2     2   SAB IAB Ectopic Molar Multiple Live Births             2      # Outcome Date GA Lbr Channing/2nd Weight Sex Delivery Anes PTL Lv   2 Term 19 40w0d 04:00 / 00:19 8 lb 8.7 oz (3.875 kg) F Vag-Spont EPI N DAYA   1 Term 17 39w0d  8 lb 3 oz (3.714 kg) F Vag-Spont   DAYA        Social History     Tobacco Use   Smoking Status Never   Smokeless Tobacco Never        Social History     Substance and Sexual Activity   Alcohol Use Yes    Comment: occ       Allergies: Benadryl [diphenhydramine] and Pcn [penicillins]      Current Outpatient Medications:     ibuprofen (ADVIL;MOTRIN) 800 MG tablet, , Disp: , Rfl:     cetirizine (ZYRTEC) 10 MG tablet, Take 10 mg by mouth daily, Disp: , Rfl:     albuterol sulfate HFA (PROVENTIL;VENTOLIN;PROAIR) 108 (90 Base) MCG/ACT inhaler, Inhale 2 puffs into the lungs every 6 hours as needed for Wheezing, Disp: , Rfl:     fluticasone (FLONASE) 50 MCG/ACT nasal spray, 2 sprays by Each Nostril route daily, Disp: 16 g, Rfl: 2    BIOTIN PO, Take by mouth, Disp: , Rfl:     Social History     Substance and Sexual Activity   Sexual Activity Yes    Partners: Male       Chief Complaint   Patient presents with    Breast Problem     Pt c/o chest pain for at least 1 week. Pt thought was a pulled muscle then discomfort moved down into breast. Denies any nipple pain or drainage. Physical Exam  Constitutional:       General: She is not in acute distress. Appearance: Normal appearance. She is obese. She is not ill-appearing. Genitourinary:      Genitourinary Comments: Bilateral dense breast tissue   Breasts:     Right: Tenderness present. No swelling, bleeding, mass, nipple discharge or skin change. Left: Tenderness present. No swelling, bleeding, mass, nipple discharge or skin change. HENT:      Head: Normocephalic. Eyes:      Pupils: Pupils are equal, round, and reactive to light. Cardiovascular:      Rate and Rhythm: Normal rate and regular rhythm. Pulses: Normal pulses. Heart sounds: Normal heart sounds. No murmur heard. Pulmonary:      Effort: Pulmonary effort is normal.      Breath sounds: Normal breath sounds. No wheezing. Musculoskeletal:         General: Normal range of motion. Cervical back: Normal range of motion. Neurological:      Mental Status: She is alert and oriented to person, place, and time. Skin:     General: Skin is warm and dry.    Psychiatric:         Behavior: Behavior normal.   Vitals and nursing note reviewed. Vital Signs  Blood pressure 110/60, height 5' 2\" (1.575 m), weight 198 lb 9.6 oz (90.1 kg), last menstrual period 12/11/2022, not currently breastfeeding. Assessment and Plan          Diagnosis Orders   1. Breast pain        2. Musculoskeletal chest pain          Discussed possibly still muscular and also increased caffeine intake causing breast pain    Recommended motrin, vitamin e, decrease caffeine intake. If no improvement in the next week then see PCP. If severe worsening s/s then go to ER. Discussed that could consider breast USN (age <30 years) if indicated if s/s do not improve - she is agreeable        I have discontinued Ame Haro's metFORMIN. I am also having her maintain her BIOTIN PO, fluticasone, albuterol sulfate HFA, cetirizine, and ibuprofen. Return Due for annual.    She was also counseled on her preventative health maintenance recommendations and follow-up. There are no Patient Instructions on file for this visit.     [address] [facility] 8:23 AM                     Electronically signed by CARIE Baugh CNM

## 2023-03-20 ENCOUNTER — OFFICE VISIT (OUTPATIENT)
Dept: OBGYN CLINIC | Age: 28
End: 2023-03-20
Payer: COMMERCIAL

## 2023-03-20 VITALS
DIASTOLIC BLOOD PRESSURE: 58 MMHG | HEIGHT: 62 IN | WEIGHT: 207 LBS | BODY MASS INDEX: 38.09 KG/M2 | SYSTOLIC BLOOD PRESSURE: 110 MMHG

## 2023-03-20 DIAGNOSIS — N91.1 AMENORRHEA, SECONDARY: Primary | ICD-10-CM

## 2023-03-20 PROCEDURE — 99213 OFFICE O/P EST LOW 20 MIN: CPT | Performed by: NURSE PRACTITIONER

## 2023-03-20 ASSESSMENT — ENCOUNTER SYMPTOMS
DIARRHEA: 0
NAUSEA: 1
RESPIRATORY NEGATIVE: 1
CONSTIPATION: 0
VOMITING: 0

## 2023-03-20 NOTE — PROGRESS NOTES
PROBLEM VISIT     Date of service: 3/20/2023    Radha Ulrich  Is a 24 y.o. female    PT's PCP is: No primary care provider on file. : 1995                                             Subjective:       Patient's last menstrual period was 2023. OB History    Para Term  AB Living   3 2 2     2   SAB IAB Ectopic Molar Multiple Live Births             2      # Outcome Date GA Lbr Channing/2nd Weight Sex Delivery Anes PTL Lv   3 Current            2 Term 19 40w0d 04:00 / 00:19 8 lb 8.7 oz (3.875 kg) F Vag-Spont EPI N DAYA   1 Term 17 39w0d  8 lb 3 oz (3.714 kg) F Vag-Spont   DAYA        Social History     Tobacco Use   Smoking Status Never   Smokeless Tobacco Never        Social History     Substance and Sexual Activity   Alcohol Use Yes    Comment: occ       Allergies: Benadryl [diphenhydramine] and Pcn [penicillins]      Current Outpatient Medications:     Prenatal Vit-Fe Fumarate-FA (PRENATAL VITAMIN PO), Take by mouth, Disp: , Rfl:     cetirizine (ZYRTEC) 10 MG tablet, Take 10 mg by mouth daily, Disp: , Rfl:     albuterol sulfate HFA (PROVENTIL;VENTOLIN;PROAIR) 108 (90 Base) MCG/ACT inhaler, Inhale 2 puffs into the lungs every 6 hours as needed for Wheezing, Disp: , Rfl:     fluticasone (FLONASE) 50 MCG/ACT nasal spray, 2 sprays by Each Nostril route daily, Disp: 16 g, Rfl: 2    BIOTIN PO, Take by mouth, Disp: , Rfl:     Social History     Substance and Sexual Activity   Sexual Activity Yes    Partners: Male       Chief Complaint   Patient presents with    Follow-up     Follow up viability usn. C/O nausea. PE:  Vital Signs  Blood pressure (!) 110/58, height 5' 2\" (1.575 m), weight 207 lb (93.9 kg), last menstrual period 2023, not currently breastfeeding. HPI: Patient presents today following viability ultrasound. Mild nausea; manageable at this time. Denies vaginal bleeding and abdominal pain.      PT denies fever, chills, nausea and vomiting

## 2023-04-04 ENCOUNTER — HOSPITAL ENCOUNTER (OUTPATIENT)
Age: 28
Setting detail: SPECIMEN
Discharge: HOME | End: 2023-04-04

## 2023-04-04 ENCOUNTER — INITIAL PRENATAL (OUTPATIENT)
Dept: OBGYN CLINIC | Age: 28
End: 2023-04-04
Payer: COMMERCIAL

## 2023-04-04 VITALS
WEIGHT: 208.2 LBS | HEIGHT: 62 IN | DIASTOLIC BLOOD PRESSURE: 70 MMHG | SYSTOLIC BLOOD PRESSURE: 118 MMHG | BODY MASS INDEX: 38.31 KG/M2

## 2023-04-04 DIAGNOSIS — O99.210 OBESITY IN PREGNANCY: ICD-10-CM

## 2023-04-04 DIAGNOSIS — Z34.81 ENCOUNTER FOR SUPERVISION OF OTHER NORMAL PREGNANCY IN FIRST TRIMESTER: ICD-10-CM

## 2023-04-04 DIAGNOSIS — Z34.81 ENCOUNTER FOR SUPERVISION OF OTHER NORMAL PREGNANCY IN FIRST TRIMESTER: Primary | ICD-10-CM

## 2023-04-04 PROBLEM — E11.9 DIABETES MELLITUS (HCC): Status: ACTIVE | Noted: 2023-04-04

## 2023-04-04 LAB
ABO/RH: NORMAL
AMPHETAMINE SCREEN URINE: NEGATIVE
ANTIBODY SCREEN: NEGATIVE
BARBITURATE SCREEN URINE: NEGATIVE
BENZODIAZEPINE SCREEN, URINE: NEGATIVE
BILIRUBIN URINE: NEGATIVE
CANNABINOID SCREEN URINE: NEGATIVE
CASTS UA: NORMAL /LPF (ref 0–8)
COCAINE METABOLITE, URINE: NEGATIVE
COLOR: ABNORMAL
EPITHELIAL CELLS UA: NORMAL /HPF (ref 0–5)
FENTANYL URINE: NEGATIVE
GLUCOSE UR STRIP.AUTO-MCNC: NEGATIVE MG/DL
HCV AB SER QL: NONREACTIVE
KETONES UR STRIP.AUTO-MCNC: NEGATIVE MG/DL
LEUKOCYTE ESTERASE UR QL STRIP.AUTO: ABNORMAL
METHADONE SCREEN, URINE: NEGATIVE
NITRITE UR QL STRIP.AUTO: NEGATIVE
OPIATES, URINE: NEGATIVE
OXYCODONE SCREEN URINE: NEGATIVE
PHENCYCLIDINE, URINE: NEGATIVE
PROT UR STRIP.AUTO-MCNC: 6 MG/DL (ref 5–8)
PROT UR STRIP.AUTO-MCNC: ABNORMAL MG/DL
RBC CLUMPS #/AREA URNS AUTO: NORMAL /HPF (ref 0–4)
SPECIFIC GRAVITY UA: 1.03 (ref 1–1.03)
TEST INFORMATION: NORMAL
TURBIDITY: CLEAR
URINE HGB: NEGATIVE
UROBILINOGEN, URINE: NORMAL
WBC UA: NORMAL /HPF (ref 0–5)

## 2023-04-04 PROCEDURE — 0500F INITIAL PRENATAL CARE VISIT: CPT | Performed by: NURSE PRACTITIONER

## 2023-04-04 PROCEDURE — H1000 PRENATAL CARE ATRISK ASSESSM: HCPCS | Performed by: NURSE PRACTITIONER

## 2023-04-04 NOTE — PROGRESS NOTES
Here for PNI, has had n/v at times. Denies hx of MRSA. Declines genetic testing. Early 1hr gtt done today with  d/t BMI. First child born with cleft lip and palate. Has NOB appt 4/19. Questions answered and forms signed.

## 2023-04-05 LAB
CHLAMYDIA DNA UR QL NAA+PROBE: ABNORMAL
GLUCOSE ADMINISTRATION: NORMAL
GLUCOSE TOLERANCE SCREEN 50G: 82 MG/DL (ref 70–135)
HIV 1+2 AB+HIV1 P24 AG SERPL QL IA: NONREACTIVE
MICROORGANISM SPEC CULT: NORMAL
N GONORRHOEA DNA UR QL NAA+PROBE: NEGATIVE
SPECIMEN DESCRIPTION: ABNORMAL
SPECIMEN DESCRIPTION: NORMAL

## 2023-04-05 RX ORDER — AZITHROMYCIN 500 MG/1
1000 TABLET, FILM COATED ORAL ONCE
Qty: 2 TABLET | Refills: 0 | Status: SHIPPED | OUTPATIENT
Start: 2023-04-05 | End: 2023-04-05

## 2023-04-06 LAB
ABSOLUTE EOS #: 0.09 K/UL (ref 0–0.44)
ABSOLUTE IMMATURE GRANULOCYTE: 0.04 K/UL (ref 0–0.3)
ABSOLUTE LYMPH #: 1.83 K/UL (ref 1.1–3.7)
ABSOLUTE MONO #: 0.58 K/UL (ref 0.1–1.2)
BASOPHILS # BLD: 0 % (ref 0–2)
BASOPHILS ABSOLUTE: <0.03 K/UL (ref 0–0.2)
EOSINOPHILS RELATIVE PERCENT: 1 % (ref 1–4)
HBV SURFACE AG SER QL: NONREACTIVE
HCT VFR BLD AUTO: 37 % (ref 36.3–47.1)
HGB BLD-MCNC: 12.2 G/DL (ref 11.9–15.1)
IMMATURE GRANULOCYTES: 0 %
LYMPHOCYTES # BLD: 18 % (ref 24–43)
MCH RBC QN AUTO: 27.4 PG (ref 25.2–33.5)
MCHC RBC AUTO-ENTMCNC: 33 G/DL (ref 28.4–34.8)
MCV RBC AUTO: 83 FL (ref 82.6–102.9)
MONOCYTES # BLD: 6 % (ref 3–12)
NRBC AUTOMATED: 0 PER 100 WBC
PDW BLD-RTO: 14.2 % (ref 11.8–14.4)
PLATELET # BLD AUTO: 279 K/UL (ref 138–453)
PMV BLD AUTO: 10.1 FL (ref 8.1–13.5)
RBC # BLD: 4.46 M/UL (ref 3.95–5.11)
RUBV IGG SER QL: 195.8 IU/ML
SEG NEUTROPHILS: 75 % (ref 36–65)
SEGMENTED NEUTROPHILS ABSOLUTE COUNT: 7.59 K/UL (ref 1.5–8.1)
T PALLIDUM AB SER QL IA: NONREACTIVE
WBC # BLD AUTO: 10.2 K/UL (ref 3.5–11.3)

## 2023-04-19 ENCOUNTER — INITIAL PRENATAL (OUTPATIENT)
Dept: OBGYN CLINIC | Age: 28
End: 2023-04-19
Payer: MEDICARE

## 2023-04-19 VITALS — BODY MASS INDEX: 37.68 KG/M2 | WEIGHT: 206 LBS | DIASTOLIC BLOOD PRESSURE: 73 MMHG | SYSTOLIC BLOOD PRESSURE: 120 MMHG

## 2023-04-19 DIAGNOSIS — Z3A.13 13 WEEKS GESTATION OF PREGNANCY: ICD-10-CM

## 2023-04-19 DIAGNOSIS — Z34.81 ENCOUNTER FOR SUPERVISION OF OTHER NORMAL PREGNANCY IN FIRST TRIMESTER: Primary | ICD-10-CM

## 2023-04-19 PROCEDURE — 99214 OFFICE O/P EST MOD 30 MIN: CPT | Performed by: NURSE PRACTITIONER

## 2023-04-19 NOTE — PROGRESS NOTES
Clayton Gonzalez is a 24 y.o. female 13w0d      The patient was seen and evaluated. Fetal movement was Absent . No contractions or leakage of fluid. Signs and symptoms of  labor as well as labor were reviewed. Genetic testing was ordered and reviewed. MSAFP was not ordered for a 15-20 week gestational age window.  Reviewed. Pap UTD. Dates were reviewed with the patient. An 18-22 week anatomy ultrasound has been ordered. The patient will return to the office for her next visit in 4 weeks. See antepartum flow sheet. Complains of emesis due to post nasal drip as she has recently had viral sickness- comfort measures and safe medication list provided. Assessment:  1. Clayton Gonzalez is a 24 y.o. female  2. [id number]  3. 13w0d    Patient Active Problem List    Diagnosis Date Noted    Diabetes mellitus ([facility] [address] 2023    Scoliosis 2014    Birth control 2014    Need for HPV vaccine 2014    Short leg syndrome, acquired 2014        Diagnosis Orders   1. Encounter for supervision of other normal pregnancy in first trimester        2. 13 weeks gestation of pregnancy              Plan:  Return in about 4 weeks (around 2023) for routine OB. There are no Patient Instructions on file for this visit.

## 2023-05-08 ENCOUNTER — ROUTINE PRENATAL (OUTPATIENT)
Dept: OBGYN CLINIC | Age: 28
End: 2023-05-08
Payer: COMMERCIAL

## 2023-05-08 ENCOUNTER — HOSPITAL ENCOUNTER (OUTPATIENT)
Age: 28
Setting detail: SPECIMEN
Discharge: HOME | End: 2023-05-08

## 2023-05-08 VITALS — BODY MASS INDEX: 37.06 KG/M2 | WEIGHT: 202.6 LBS | DIASTOLIC BLOOD PRESSURE: 60 MMHG | SYSTOLIC BLOOD PRESSURE: 100 MMHG

## 2023-05-08 DIAGNOSIS — Z3A.15 15 WEEKS GESTATION OF PREGNANCY: ICD-10-CM

## 2023-05-08 DIAGNOSIS — Z34.82 PRENATAL CARE, SUBSEQUENT PREGNANCY, SECOND TRIMESTER: Primary | ICD-10-CM

## 2023-05-08 DIAGNOSIS — Z34.82 PRENATAL CARE, SUBSEQUENT PREGNANCY, SECOND TRIMESTER: ICD-10-CM

## 2023-05-08 DIAGNOSIS — Z20.2 CHLAMYDIA CONTACT, TREATED: ICD-10-CM

## 2023-05-08 PROCEDURE — 99213 OFFICE O/P EST LOW 20 MIN: CPT | Performed by: ADVANCED PRACTICE MIDWIFE

## 2023-05-08 NOTE — PROGRESS NOTES
Akilah Bishop is a 24 y.o. female 16w9d      The patient was seen and evaluated. Fetal movement - quickening present. No contractions or leakage of fluid. Signs and symptoms of  labor as well as labor were reviewed. Genetic testing was not ordered and reviewed. MSAFP was ordered for a 15-20 week gestational age window.  Reviewed. Dates were reviewed with the patient. An 18-22 week anatomy ultrasound has been ordered. The patient will return to the office for her next visit in 4 weeks. See antepartum flow sheet. Reports sciatica pain - has had in other pregnancies - discussed relief measures, chiro, stretching. Discussed s/s to call office for should they occur    NORRIS today for chlamydia diagnosis - partner was also treated    Desires OCRRS if c/s - tubal ligation consent for medicaid signed. The patient had the procedure discussed with her at length and in detail. The risks and benefits of concurrent ovarian cancer risk reducing bilateral salpingectomy with the patient's upcoming scheduled abdominal or pelvic surgery. This patient is at above average risk for development of ovarian cancer due to pre pregnancy BMI greater than 35 and family history of breast cancer. I advised the patient that the primary benefit of such surgery is up to a 65% reduction in future risk of ovarian cancer. Finally, the patient has been thoroughly counseled regarding the consequence of loss of fertility following this procedure. The patient understands that this loss of fertility cannot be reversed and has expressed via verbal and written consent that her wishes are to proceed with this surgery for the purposes of reducing risk for ovarian cancer. Assessment:  1. Akilah Bishop is a 24 y.o. female  2.  [id number]  3. 15w5d    Patient Active Problem List    Diagnosis Date Noted    Diabetes mellitus ([facility] [address] 2023    Scoliosis 2014    Birth control 2014    Need for HPV vaccine 2014

## 2023-05-09 LAB
AFP INTERPRETATION: NORMAL
AFP MOM: 1.43
AFP SPECIMEN: NORMAL
AFP: 38 NG/ML
C TRACH DNA SPEC QL PROBE+SIG AMP: NEGATIVE
DATE OF BIRTH: NORMAL
DATING METHOD: NORMAL
DETERMINED BY: NORMAL
DIABETIC: NEGATIVE
DONOR EGG?: NORMAL
DUE DATE: NORMAL
ESTIMATED DUE DATE: NORMAL
FAMILY HISTORY NTD: NEGATIVE
GESTATIONAL AGE: NORMAL
IN VITRO FERTILIZATION: NORMAL
INSULIN REQ DIABETES: NO
LAST MENSTRUAL PERIOD: NORMAL
MATERNAL AGE AT EDD: 25.9 YR
MATERNAL WEIGHT: 202
MONOCHORIONIC TWINS: NORMAL
N GONORRHOEA DNA SPEC QL PROBE+SIG AMP: NEGATIVE
NUMBER OF FETUSES: NORMAL
PATIENT WEIGHT UNITS: NORMAL
PATIENT WEIGHT: NORMAL
RACE (MATERNAL): NORMAL
RACE: NORMAL
REPEAT SPECIMEN?: NORMAL
SMOKING: NORMAL
SMOKING: NORMAL
SPECIMEN DESCRIPTION: NORMAL
VALPROIC/CARBAMAZEP: NORMAL
ZZ NTE CLEAN UP: HISTORY: NO

## 2023-06-21 ENCOUNTER — TELEPHONE (OUTPATIENT)
Dept: OBGYN CLINIC | Age: 28
End: 2023-06-21

## 2023-06-21 NOTE — TELEPHONE ENCOUNTER
Pt calling wondering about restrictions for work. Wondering if she could have something stating weight restrictions and any other general restrictions she may have being pregnant. OK to give to her?

## 2023-06-22 NOTE — TELEPHONE ENCOUNTER
Routine pregnancy note with no repetitive pulling/pushing/lifting over 25# and recommend 40 hour work week

## 2023-07-02 SDOH — ECONOMIC STABILITY: HOUSING INSECURITY
IN THE LAST 12 MONTHS, WAS THERE A TIME WHEN YOU DID NOT HAVE A STEADY PLACE TO SLEEP OR SLEPT IN A SHELTER (INCLUDING NOW)?: NO

## 2023-07-02 SDOH — ECONOMIC STABILITY: INCOME INSECURITY: HOW HARD IS IT FOR YOU TO PAY FOR THE VERY BASICS LIKE FOOD, HOUSING, MEDICAL CARE, AND HEATING?: NOT HARD AT ALL

## 2023-07-02 SDOH — ECONOMIC STABILITY: FOOD INSECURITY: WITHIN THE PAST 12 MONTHS, YOU WORRIED THAT YOUR FOOD WOULD RUN OUT BEFORE YOU GOT MONEY TO BUY MORE.: PATIENT DECLINED

## 2023-07-02 SDOH — ECONOMIC STABILITY: TRANSPORTATION INSECURITY
IN THE PAST 12 MONTHS, HAS LACK OF TRANSPORTATION KEPT YOU FROM MEETINGS, WORK, OR FROM GETTING THINGS NEEDED FOR DAILY LIVING?: YES

## 2023-07-02 SDOH — ECONOMIC STABILITY: FOOD INSECURITY: WITHIN THE PAST 12 MONTHS, THE FOOD YOU BOUGHT JUST DIDN'T LAST AND YOU DIDN'T HAVE MONEY TO GET MORE.: PATIENT DECLINED

## 2023-07-05 ENCOUNTER — ROUTINE PRENATAL (OUTPATIENT)
Dept: OBGYN CLINIC | Age: 28
End: 2023-07-05

## 2023-07-05 VITALS — DIASTOLIC BLOOD PRESSURE: 58 MMHG | WEIGHT: 215.8 LBS | SYSTOLIC BLOOD PRESSURE: 100 MMHG | BODY MASS INDEX: 39.47 KG/M2

## 2023-07-05 DIAGNOSIS — Z34.92 NORMAL PREGNANCY IN SECOND TRIMESTER: Primary | ICD-10-CM

## 2023-07-05 DIAGNOSIS — Z3A.24 24 WEEKS GESTATION OF PREGNANCY: ICD-10-CM

## 2023-07-05 NOTE — PROGRESS NOTES
Izzy Mcconnell is a 24 y.o. female 18w0d    The patient was seen and evaluated. There was positive fetal movements. No contractions or leakage of fluid. Signs and symptoms of  labor as well as labor were reviewed. The patients anatomy ultrasound has been completed and reviewed with patient. A 28 week lab panel was ordered. This includes a (HH, 1 hr GTT, 3 hr GTT). The patient is to complete this in the next two to four weeks. The S/S of Pre-Eclampsia were reviewed with the patient in detail. She is to report any of these if they occur. She currently denies any of these. The patient is RH positive Rhogam Ordered no    The patient was instructed on fetal kick counts and was encouraged to monitor every 8 hours. This is to begin at 28 weeks gestation. She was instructed that the baby should move at a minimum of ten times within one hour after a meal. The patient was instructed to lay down on her left side twenty minutes after eating and count movements for up to one hour with a target value of ten movements. She was instructed to notify the office if she did not make that target after two attempts or if after any attempt there was less than four movements. Assessment:  1. Izzy Mcconnell is a 24 y.o. female  2. [id number]  3. 24w0d    Patient Active Problem List    Diagnosis Date Noted    Diabetes mellitus ([address]) 2023    Scoliosis 2014    Birth control 2014    Need for HPV vaccine 2014    Short leg syndrome, acquired 2014        Diagnosis Orders   1. Normal pregnancy in second trimester        2. 24 weeks gestation of pregnancy  CBC    Glucose tolerance, 1 hour            Plan:  The patient will return to the office for her next visit in 4 weeks. See antepartum flow sheet. Discussed signs and symptoms of preeclampsia, brandi pinon versus  labor, and fetal movement. Discussed one hour glucose at next visit.

## 2023-08-01 ENCOUNTER — TELEPHONE (OUTPATIENT)
Dept: OBGYN CLINIC | Age: 28
End: 2023-08-01